# Patient Record
Sex: MALE | Race: WHITE | NOT HISPANIC OR LATINO | URBAN - METROPOLITAN AREA
[De-identification: names, ages, dates, MRNs, and addresses within clinical notes are randomized per-mention and may not be internally consistent; named-entity substitution may affect disease eponyms.]

---

## 2019-12-20 ENCOUNTER — RECORDS - HEALTHEAST (OUTPATIENT)
Dept: LAB | Facility: CLINIC | Age: 29
End: 2019-12-20

## 2019-12-22 LAB — BACTERIA SPEC CULT: NORMAL

## 2021-02-26 ENCOUNTER — COMMUNICATION - HEALTHEAST (OUTPATIENT)
Dept: TELEHEALTH | Facility: CLINIC | Age: 31
End: 2021-02-26

## 2021-02-26 ENCOUNTER — OFFICE VISIT - HEALTHEAST (OUTPATIENT)
Dept: FAMILY MEDICINE | Facility: CLINIC | Age: 31
End: 2021-02-26

## 2021-02-26 DIAGNOSIS — R41.840 CONCENTRATION DEFICIT: ICD-10-CM

## 2021-02-26 DIAGNOSIS — R45.4 DIFFICULTY CONTROLLING ANGER: ICD-10-CM

## 2021-02-26 DIAGNOSIS — F10.10 ALCOHOL CONSUMPTION BINGE DRINKING: ICD-10-CM

## 2021-02-26 DIAGNOSIS — F19.11 H/O MIXED DRUG ABUSE (H): ICD-10-CM

## 2021-02-26 DIAGNOSIS — G47.26 SHIFT WORK SLEEP DISORDER: ICD-10-CM

## 2021-02-26 DIAGNOSIS — Z13.228 SCREENING FOR METABOLIC DISORDER: ICD-10-CM

## 2021-02-26 DIAGNOSIS — F31.32 BIPOLAR AFFECTIVE DISORDER, CURRENTLY DEPRESSED, MODERATE (H): ICD-10-CM

## 2021-02-26 LAB
ALBUMIN SERPL-MCNC: 4.7 G/DL (ref 3.5–5)
ALP SERPL-CCNC: 82 U/L (ref 45–120)
ALT SERPL W P-5'-P-CCNC: 31 U/L (ref 0–45)
ANION GAP SERPL CALCULATED.3IONS-SCNC: 9 MMOL/L (ref 5–18)
AST SERPL W P-5'-P-CCNC: 25 U/L (ref 0–40)
BILIRUB SERPL-MCNC: 0.7 MG/DL (ref 0–1)
BUN SERPL-MCNC: 16 MG/DL (ref 8–22)
CALCIUM SERPL-MCNC: 9.8 MG/DL (ref 8.5–10.5)
CHLORIDE BLD-SCNC: 106 MMOL/L (ref 98–107)
CHOLEST SERPL-MCNC: 192 MG/DL
CO2 SERPL-SCNC: 28 MMOL/L (ref 22–31)
CREAT SERPL-MCNC: 1.1 MG/DL (ref 0.7–1.3)
FASTING STATUS PATIENT QL REPORTED: NO
GFR SERPL CREATININE-BSD FRML MDRD: >60 ML/MIN/1.73M2
GLUCOSE BLD-MCNC: 66 MG/DL (ref 70–125)
HBA1C MFR BLD: 5.3 %
HDLC SERPL-MCNC: 58 MG/DL
LDLC SERPL CALC-MCNC: 113 MG/DL
POTASSIUM BLD-SCNC: 4.5 MMOL/L (ref 3.5–5)
PROT SERPL-MCNC: 7.3 G/DL (ref 6–8)
SODIUM SERPL-SCNC: 143 MMOL/L (ref 136–145)
TRIGL SERPL-MCNC: 104 MG/DL
TSH SERPL DL<=0.005 MIU/L-ACNC: 0.94 UIU/ML (ref 0.3–5)

## 2021-02-26 ASSESSMENT — ANXIETY QUESTIONNAIRES
4. TROUBLE RELAXING: SEVERAL DAYS
5. BEING SO RESTLESS THAT IT IS HARD TO SIT STILL: SEVERAL DAYS
3. WORRYING TOO MUCH ABOUT DIFFERENT THINGS: SEVERAL DAYS
6. BECOMING EASILY ANNOYED OR IRRITABLE: NEARLY EVERY DAY
7. FEELING AFRAID AS IF SOMETHING AWFUL MIGHT HAPPEN: SEVERAL DAYS
2. NOT BEING ABLE TO STOP OR CONTROL WORRYING: SEVERAL DAYS
IF YOU CHECKED OFF ANY PROBLEMS ON THIS QUESTIONNAIRE, HOW DIFFICULT HAVE THESE PROBLEMS MADE IT FOR YOU TO DO YOUR WORK, TAKE CARE OF THINGS AT HOME, OR GET ALONG WITH OTHER PEOPLE: EXTREMELY DIFFICULT
1. FEELING NERVOUS, ANXIOUS, OR ON EDGE: SEVERAL DAYS
GAD7 TOTAL SCORE: 9

## 2021-02-26 ASSESSMENT — MIFFLIN-ST. JEOR: SCORE: 1981.29

## 2021-02-26 ASSESSMENT — PATIENT HEALTH QUESTIONNAIRE - PHQ9: SUM OF ALL RESPONSES TO PHQ QUESTIONS 1-9: 15

## 2021-03-01 ENCOUNTER — COMMUNICATION - HEALTHEAST (OUTPATIENT)
Dept: FAMILY MEDICINE | Facility: CLINIC | Age: 31
End: 2021-03-01

## 2021-03-02 ENCOUNTER — OFFICE VISIT - HEALTHEAST (OUTPATIENT)
Dept: BEHAVIORAL HEALTH | Facility: CLINIC | Age: 31
End: 2021-03-02

## 2021-03-02 DIAGNOSIS — F32.1 MODERATE MAJOR DEPRESSION (H): ICD-10-CM

## 2021-03-02 DIAGNOSIS — F43.23 ADJUSTMENT DISORDER WITH MIXED ANXIETY AND DEPRESSED MOOD: ICD-10-CM

## 2021-03-02 ASSESSMENT — ANXIETY QUESTIONNAIRES
2. NOT BEING ABLE TO STOP OR CONTROL WORRYING: NOT AT ALL
6. BECOMING EASILY ANNOYED OR IRRITABLE: MORE THAN HALF THE DAYS
7. FEELING AFRAID AS IF SOMETHING AWFUL MIGHT HAPPEN: NOT AT ALL
4. TROUBLE RELAXING: SEVERAL DAYS
IF YOU CHECKED OFF ANY PROBLEMS ON THIS QUESTIONNAIRE, HOW DIFFICULT HAVE THESE PROBLEMS MADE IT FOR YOU TO DO YOUR WORK, TAKE CARE OF THINGS AT HOME, OR GET ALONG WITH OTHER PEOPLE: SOMEWHAT DIFFICULT
5. BEING SO RESTLESS THAT IT IS HARD TO SIT STILL: NOT AT ALL
GAD7 TOTAL SCORE: 5
1. FEELING NERVOUS, ANXIOUS, OR ON EDGE: SEVERAL DAYS
3. WORRYING TOO MUCH ABOUT DIFFERENT THINGS: SEVERAL DAYS

## 2021-03-02 ASSESSMENT — PATIENT HEALTH QUESTIONNAIRE - PHQ9: SUM OF ALL RESPONSES TO PHQ QUESTIONS 1-9: 14

## 2021-03-18 ENCOUNTER — AMBULATORY - HEALTHEAST (OUTPATIENT)
Dept: BEHAVIORAL HEALTH | Facility: CLINIC | Age: 31
End: 2021-03-18

## 2021-05-27 ASSESSMENT — PATIENT HEALTH QUESTIONNAIRE - PHQ9
SUM OF ALL RESPONSES TO PHQ QUESTIONS 1-9: 14
SUM OF ALL RESPONSES TO PHQ QUESTIONS 1-9: 15

## 2021-05-28 ASSESSMENT — ANXIETY QUESTIONNAIRES
GAD7 TOTAL SCORE: 9
GAD7 TOTAL SCORE: 5

## 2021-06-02 ENCOUNTER — RECORDS - HEALTHEAST (OUTPATIENT)
Dept: ADMINISTRATIVE | Facility: CLINIC | Age: 31
End: 2021-06-02

## 2021-06-05 VITALS
BODY MASS INDEX: 28 KG/M2 | WEIGHT: 211.3 LBS | OXYGEN SATURATION: 96 % | HEIGHT: 73 IN | DIASTOLIC BLOOD PRESSURE: 66 MMHG | HEART RATE: 84 BPM | SYSTOLIC BLOOD PRESSURE: 112 MMHG

## 2021-06-15 NOTE — PROGRESS NOTES
MALE PREVENTATIVE EXAM    Assessment and Plan:   Patient has been advised of split billing requirements and indicates understanding: Yes    Joo was seen today for depression.    Bipolar affective disorder, currently depressed, moderate (H)  By history he does make a diagnosis of bipolar depression.  Might have manic episode when he was drinking 1 bottle of vodka last week.  Or may be just anger issues proper evaluation recommended to the psychologist     -     AMB REFERRAL TO MENTAL HEALTH AND ADDICTION  - Adult (18+); Outpatient Treatment; Individual/Couples/Family/Group Therapy/Health Psychology; [facility]; Any Clinic Location; We will contact you to schedule the appointment or plea...  -     Thyroid Stimulating Hormone (TSH)    Alcohol consumption binge drinking  Comments:  when stress and angery   Orders:  -     AMB REFERRAL TO MENTAL HEALTH AND ADDICTION  - Adult (18+); Outpatient Treatment; Individual/Couples/Family/Group Therapy/[facility]; [facility]; Any Clinic Location; We will contact you to schedule the appointment or plea...  -     Comprehensive Metabolic Panel    Difficulty controlling anger  -     AMB REFERRAL TO MENTAL HEALTH AND ADDICTION  - Adult (18+); Outpatient Treatment; Individual/Couples/Family/Group Therapy/[facility]; [facility]; Any Clinic Location; We will contact you to schedule the appointment or plea...    H/O mixed drug abuse (H)  -     AMB REFERRAL TO MENTAL HEALTH AND ADDICTION  - Adult (18+); Outpatient Treatment; Individual/Couples/Family/Group Therapy/[facility]; [facility]; Any Clinic Location; We will contact you to schedule the appointment or plea...    Shift work sleep disorder  Comments:  work overnight shift as baker at [employer]   Orders:  -     AMB REFERRAL TO MENTAL HEALTH AND ADDICTION  - Adult (18+); Outpatient Treatment; Individual/Couples/Family/Group Therapy/Health  Psychology; [facility]; Any Clinic Location; We will contact you to schedule the appointment or plea...    Concentration deficit    Screening for metabolic disorder  -     Lipid Cascade  -     Glycosylated Hemoglobin A1c      Follow up one month for mental health , after initial eval with psychologist   Next follow up:  Yearly       Immunization Review    Immunization History   Administered Date(s) Administered     Dtap 1993     Hep B, Peds or Adolescent 2004, 2004, 2005     Hepatitis A, Ped/Adol 2 Dose IM (18yr & under) 2007, 2008     IPV 1991, 1992, 1993, 1997     MMR 1992, 2004     Td, adult adsorbed, PF 2004     Tdap 2010, 2018       I have had an Advance Directives discussion with the patient.  The following are part of a depression follow up plan for the patient:  implementation of measures to provide psychological support, mental health screening assessment, mental cem screening education, mental health treatment assessment, taking history of mental health assessments and taking history of mental health management  The following high BMI interventions were performed this visit: dietary management education, guidance, and counseling     I have had an Advance Directives discussion with the patient.    Subjective:   Chief Complaint: Joo Godfrey is an 30 y.o. male here for a preventative health visit.     HPI:  Bipolar Depression/ alcohol abuse/anger management issues : Patient was new to my practice here with chief complaint of struggling with a lot of mental health issues recently had a baby girl 2 days ago currently struggling with relationship with his girlfriend they do live together with the  baby off-and-on relationship since high school, patient feel he does have a lot of concern with anger management, binge drinking, lying and cheating on previous girlfriends including current  "girlfriend since the baby he really wants to make a change in his life.  Does smoke marijuana daily basis also chew tobacco.  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  Smart of time the sleep concern could be shift disorder.  Patient also report when asked history of ADHD and he said he was diagnosed but not treated   Onset was approximately several years ago, gradually worsening since that time.  He denies current suicidal and homicidal plan or intent.   Family history significant for alcoholism, anxiety and depression.Possible organic causes contributing are: alcohol/marijuana.  Risk factors: previous episode of depression Previous treatment includes none    Healthy Habits  Are you taking a daily aspirin? No  Do you typically exercising at least 40 min, 3-4 times per week?  NO  Do you usually eat at least 4 servings of fruit and vegetables a day, include whole grains and fiber and avoid regularly eating high fat foods? NO  Have you had an eye exam in the past two years? NO  Do you see a dentist twice per year? Yes  Do you have any concerns regarding sleep? YES    Safety Screen  If you own firearms, are they secured in a locked gun cabinet or with trigger locks? The patient does not own any firearms    No data recorded    Review of Systems:  Please see above.  The rest of the review of systems are negative for all systems.     Cancer Screening     Patient has no health maintenance due at this time          Patient Care Team:  Nikki Stein MD as PCP - General (Family Medicine)        History     Reviewed By Date/Time Sections Reviewed    Deena Parnell CMA 2/26/2021 10:52 AM Tobacco    Deena Parnell CMA 2/26/2021 10:50 AM Tobacco, Alcohol, Drug Use            Objective:   Vital Signs:   Visit Vitals  /66 (Patient Site: Left Arm, Patient Position: Sitting, Cuff Size: Adult Regular)   Pulse 84   Ht 6' 1.25\" (1.861 m)   Wt 211 lb 4.8 oz (95.8 " kg)   SpO2 96%   BMI 27.69 kg/m           PHYSICAL EXAM  Physical Exam:  General Appearance:  Appears comfortable, Alert, cooperative, no distress,   Head: Normocephalic, without obvious abnormality, atraumatic  Eyes: PERRL, conjunctiva/corneas clear, EOM's intact, both eyes             Nose: Nares normal, no drainage   Throat: Lips, mucosa, and tongue normal; teeth and gums normal  Neck: Supple, symmetrical, trachea midline, no adenopathy;                      Lungs: Clear to auscultation bilaterally, respirations unlabored  Heart: Regular rate and rhythm, S1 and S2 normal, no murmur, rubs or gallop  Skin: no rashes or lesions    Nikki Stein MD 2/26/2021 12:33 PM                    Medication List      as of February 26, 2021 11:34 AM     You have not been prescribed any medications.         Additional Screenings Completed Today:

## 2021-06-15 NOTE — PROGRESS NOTES
"Mental Health Visit Note    Patient: Joo Godfrey    : 1990 MRN: [id number]    3/2/2021    Start time: 1000    Stop Time: 1052   Session # 1    Session Type: Patient is presenting for an Individual session.    Patient was identified by indicating his name and date of birth    Joo Godfrey is a 30 y.o. male is being seen today for initial evaluation per referral from   Dr. Nikki Stein due to issues related to anxiety, depression and  psychosocial stressors.    Patient stated that he had been diagnosed when he was 14 years old he believes in the [facility] with ADHD and then was referred to a Psychiatrist for medication management.  He reports he was prescribed Adderall and was on that medication for approximately 1 year although, he decided he no longer wanted to take that medication because \"I just did not feel like myself I was much quieter and I want to fit him.\"     Patient stated, that since the birth of his daughter Patrick a few days ago he is wanting to make sure that his life is on track and is making good decisions going forward.  Patient indicated that he has a history of periodic binge drinking primarily to take the edge off and to calm his worries.  Patient works full-time at [employer] shift work with varying work schedules.  He stated it can be very difficult getting consistent sleep and being consistent with his medications when his work hours vacillate.  History of 2 DWIs both entailed partaking in Rule 25 assessments as well as, at one point attending AA. Plan will be to obtain thorough intake information, completing Diagnostic Assessment, Treatment Plan and making any appropriate referrals for testing and/or med management.      Telemedicine Visit: The patient's condition can be safely assessed and treated via synchronous audio and visual telemedicine encounter.      Reason for Telemedicine Visit: Patient has requested telehealth visit    Originating Site (Patient " Location): Patient's home    Distant Site (Provider Location): Provider Remote Setting- Home Office    Consent:  The patient/guardian has verbally consented to: the potential risks and benefits of telemedicine (video visit) versus in person care; bill my insurance or make self-payment for services provided; and responsibility for payment of non-covered services.     Mode of Communication:  Video Conference via [employer] due to patient not having yet set up myVBO    As the provider I attest to compliance with applicable laws and regulations related to telemedicine.    Those present for this visit (patient and therapist)      New symptoms or complaints: Little interest in doing things feeling down, trouble falling asleep, feeling tired, overeating, feeling bad about self, trouble concentrating, procrastination, difficulty following through with things,  Plan anxious, worrying about different things, trouble relaxing, easily annoyed and irritable, intermittent binge drinking and marijuana use (per medical record)    Functional Impairment:   Personal: 4  Family: 3  Work: 3  Social:2    NORMAN-7 scoring 5 indicating mild anxiety   PHQ-9 scoring 14 indicating moderate depression   C-SSRS denies any past or current suicidal ideations or intentions    ASSESSMENT: Current Emotional / Mental Status (status of significant symptoms):              Risk status (Self / Other harm or suicidal ideation)              Patient denies any personal safety issues              Patient denies current or recent suicidal ideation or behaviors.              Patient denies current or recent homicidal ideation or behaviors.              Patient denies current or recent self injurious behavior or ideation.              Patient denies other safety concerns.              Patient denies any risk issues              Patient indicated family and girlfriend are supportive and involved              Recommended that patient call 911 or go to the  "local ED should there be a change in any of these risk factors.                Attitude:                                   Cooperative               Orientation:                             Oriented x3              Speech                                    Clear                          Rate / Production:       Normal/ Responsive Normal                           Volume:                       Normal               Mood:                                      Normal              Thought Content:                    Clear               Thought Form:                        Coherent  Logical               Insight:                                     Good     Patient's impression of their current status: Patient stated, \"I just need to get things together my daughter was born just a few days ago and I want to be a good parent.\"    Therapist impression of patients current state: The patient presented for an initial session with provider.  Patient is a 30-year-old male who was referred by Dr. Nikki Doran to engage in individual psychotherapy to address symptoms of depression, anxiety, anger and psychosocial stressors.  Patient appeared cooperative and open-minded throughout the intake and easily engaged in dialogue.  Therapist and patient verbally reviewed consent to privacy policy.  Patient reported understanding and provided verbal consent.  The patient has not engaged in outpatient psychotherapy services in the community so there is no diagnostic assessment on file available.  The patient completed the following questionnaires today PHQ-9, NORMAN-7 and C-SSRS.  No concerns about patient safety or the safety of others per assessment today.  Therapist will review patient's further history, mail out intake form and follow-up in order to complete a standard diagnostic assessment.  Goals for treatment will be established after the second or third follow-up session with this provider.    Type of psychotherapeutic technique provided: " Insight oriented and Solution-focused ADHD education    Progress toward short term goals: Not yet established    Review of long term goals: Not yet established    Diagnosis:  1.  Adjustment reaction with anxiety and depressed mood  2.  Moderate major depression (H)    Plan and Follow up:   1.  Patient to complete adult intake questionnaire as well as ADHD questionnaire  2.  Begin developing therapeutic relationship  3.  Begin establishing an exercise regime and good sleep hygiene  4.  Next appointment in 2 weeks    Discharge Criteria/Planning: Patient will continue with follow-up until therapy can be discontinued without return of signs and symptoms.     I have reviewed the note as documented above.  This accurately captures the substance of my conversation with the patient.  As the provider I attest to compliance with applicable laws and regulations related to telemedicine.  Performed and documented by   Polina Haro [facility]  3/2/21

## 2021-06-16 PROBLEM — G47.26 SHIFT WORK SLEEP DISORDER: Status: ACTIVE | Noted: 2021-02-26

## 2021-06-16 PROBLEM — R41.840 CONCENTRATION DEFICIT: Status: ACTIVE | Noted: 2021-02-26

## 2021-06-16 PROBLEM — F10.10 ALCOHOL CONSUMPTION BINGE DRINKING: Status: ACTIVE | Noted: 2021-02-26

## 2021-06-16 PROBLEM — F32.1 MODERATE MAJOR DEPRESSION (H): Status: ACTIVE | Noted: 2021-02-26

## 2021-06-16 PROBLEM — R45.4 DIFFICULTY CONTROLLING ANGER: Status: ACTIVE | Noted: 2021-02-26

## 2021-06-16 PROBLEM — F19.11: Status: ACTIVE | Noted: 2021-02-26

## 2021-06-16 NOTE — PROGRESS NOTES
Patient was scheduled for follow-up visit today although, had forgotten that he was working.  Patient rescheduled for 2 weeks.    Polina Haro [facility]  3/18/21

## 2021-06-18 NOTE — PATIENT INSTRUCTIONS - HE
Patient Instructions by Nikki Stein MD at 2/26/2021 10:40 AM     Author: Nikki Stein MD Service: -- Author Type: Physician    Filed: 2/26/2021 11:22 AM Encounter Date: 2/26/2021 Status: Signed    : Nikki Stein MD (Physician)         Patient Education     Counseling for Depression  For some people, counseling, also called talk therapy, has been found to be as effective as medicine for mild to moderate depression. When done by a trained professional, this treatment is a powerful way to better understand your thoughts and feelings. Like medicine, it may take time before you notice how much counseling is helping.    Kinds of talk therapy  Different counselors use different methods for talk therapy. But all therapy aims to help change how you think about your problem. Most therapy for depression is often done one-on-one. But it may also be done in a group setting. You and your healthcare provider can discuss the type of therapy you think would work best for you. You can also discuss who the best person is to provide the therapy.  How therapy helps  Talking about your problems can help them seem less overwhelming. It can help work through problems you have with your life and your relationships. It can also help you understand how depression is clouding your thinking, not letting you see the world the way it really is. Therapy can give you:    Insight about your emotions    New tools for dealing with your problems    Emotional support for making progress  Getting better takes time  Talk therapy can help you feel better. But change doesnt happen right away. Depression takes away your energy and motivation. So it can be hard to feel like going to therapy and sticking with it. But therapy has been proven to be very valuable in the treatment of depression. Therapy for depression is often done for a set number of sessions. In other cases, you and your therapist decide together at what point you no longer need  therapy.  Additional sources of help  In addition to a professional counselor, it may help to talk to other people in your life. You may find support and insight from:    A close friend or family member    A  trained in counseling    A local support group or community group    A 12-step program (such as Alcoholics Anonymous) for dealing with problems that can contribute to depression, such as alcohol or drug addiction  Date Last Reviewed: 1/1/2017 2000-2019 [employer]. [address] [place] [address]. All rights reserved. This information is not intended as a substitute for professional medical care. Always follow your healthcare professional's instructions.           Patient Education     Depression: Tips to Help Yourself    As your healthcare providers help treat your depression, you can also help yourself. Keep in mind that your illness affects you emotionally, physically, mentally, and socially. So full recovery will take time. Take care of your body and your soul, and be patient with yourself as you get better.  Self-care    Educate yourself. Read about treatment and medicine options. If you have the energy, attend local conferences or support groups. Keep a list of useful websites and helpful books and use them as needed. This illness is not your fault. Dont blame yourself for your depression.    Manage early symptoms. If you notice symptoms returning, experience triggers, or identify other factors that may lead to a depressive episode, get help as soon as possible. Ask trusted friends and family to monitor your behavior and let you know if they see anything of concern.    Work with your provider. Find a provider you can trust. Communicate honestly with that person and share information on your treatment for depression and your reaction to medicines.    Be prepared for a crisis. Know what to do if you experience a crisis. Keep the phone number of a crisis hotline and  "know the location of your community's urgent care centers and the closest emergency department.    Hold off on big decisions. Depression can cloud your judgment. So wait until you feel better before making major life decisions, such as changing jobs, moving, or getting  or .    Be patient. Recovering from depression is a process. Dont be discouraged if it takes some time to feel better.    Keep it simple. Depression saps your energy and concentration. So you wont be able to do all the things you used to do. Set small goals and do what you can.    Be with others. Dont isolate yourself--youll only feel worse. Try to be with other people. And take part in fun activities when you can. Go to a movie, ballgame, Mosque service, or social event. Talk openly with people you can trust. And accept help when its offered.  Take care of your body  People with depression often lose the desire to take care of themselves. That only makes their problems worse. During treatment and afterward, make a point to:    Exercise. Its a great way to take care of your body. And studies have shown that exercise helps fight depression.    Avoid drugs and alcohol. These may ease the pain in the short term. But theyll only make your problems worse in the long run.    Get relief from stress. Ask your healthcare provider for relaxation exercises and techniques to help relieve stress.    Eat right. A balanced and healthy diet helps keep your body healthy.  Date Last Reviewed: 1/1/2017 2000-2019 [employer]. [address] [place] [address], [place] [address]. All rights reserved. This information is not intended as a substitute for professional medical care. Always follow your healthcare professional's instructions.           Patient Education     Bipolar Disorder  Bipolar disorder is an illness that causes strong mood swings between depression and kika. It used to be called \"manic depression.\" The mood swings are different " from the normal ups and downs we all experience in our lives. They are more severe, last longer, and can interfere with work and relationships. These episodes are changes from our usual moods and behavior. Their severity can be mild, or drastic and explosive.    In a manic episode, you may think fast and do things quickly. It may seem like you are getting a lot done. At first, this may feel very good. But in the extreme this can lead to a lifestyle that is disorganized, chaotic, and includes risky behavior (spending sprees, sexual acting-out, or drug use). In later stages, it may affect eating (no interest in food) and sleeping (unable to sleep for days at a time). Speech may speed up and become difficult for others to understand. You may appear to others as if you are in your own world.    In a depressive episode, you may feel a lack of interest in normal activities. Sometimes there is sadness or guilt without any clear reason. Thinking may become slow and there can be a lack energy or feeling of hopelessness. Some people have thoughts of harming themselves at this stage. Thoughts can even turn to suicide.  Between these phases you may actually feel OK. This does not mean that the illness is gone. People with this disorder will usually have to treat it all of their life. Medicine and good care can greatly reduce the symptoms.  The exact cause of this illness is unknown. However, there is a genetic link that makes a person more likely to get this problem. Also, the use of drugs such as speed (amphetamine) and cocaine increase the chances of this illness appearing.  Home care  Here is what you can do at home:    Ongoing care and support help people manage this disease. Find a healthcare provider and therapist who meet your needs. Seek help when you feel like you may be heading into either a manic episode or a depressive state.    Be sure to take your medicine and get regular blood work to check the levels of medicine  in your body. Take the medicine and get the follow-up lab work as prescribed, even if you think you dont need to do it.    Be certain to tell each of your healthcare providers about all of the prescription and over-the-counter medicines, and supplements you take. Certain supplements interact with medicines and result in dangerous side effects. You can also use your pharmacist as a resource person when you have questions about medicine interactions.    Talk with your family and trusted friends about your thoughts and feelings. Ask them to help you recognize behavior changes early so you can get help and medicines can be adjusted.    Alcohol and drugs can bring on an episode, and make them worse    If your life is severely impacted by this illness, the Americans with Disabilities Act (ADA) may provide help. The ADA protects people with chronic physical and mental health problems. If you are having trouble keeping jobs, managing workplace issues, or caring for yourself because of your bipolar disorder, contact your local ADA office to see if it can help. The [place] [employer] operates a toll-free ADA information line at: 891.527.4818 (Voice); or 999-542-4089 (TTY). It can help you locate a local office.    Follow-up care  Follow up with your healthcare provider or therapist as advised. They can help you to find ways to improve your life.  Call 911  Call 911 if any of these happen:    You have suicidal thoughts, a plan, and the means to  harm yourself, or serious thoughts of hurting someone else    Trouble breathing    Confusion    Drowsiness or trouble wakening    Fainting or loss of consciousness    Rapid heart rate, very low heart rate, or a new irregular heart rate    Seizure    New chest pain that becomes more severe, lasts longer, or spreads into your shoulder, arm, neck, jaw, or back  When to seek medical advice  Call your healthcare provider right away if any of these happen:    Feeling like your symptoms  are getting worse (depression, agitation, and excess energy)    Unable to eat or sleep for more than 48 hours    Feeling out of control (racing thoughts, or poor concentration)    Feeling like you want to harm yourself or another    Being unable to care for yourself  Date Last Reviewed: 10/1/2017    5824-1073 [employer]. [address] [place] [address], [place] [address]. All rights reserved. This information is not intended as a substitute for professional medical care. Always follow your healthcare professional's instructions.           Patient Education     Treating Bipolar Disorder    Bipolar disorder results in extreme mood swings that can greatly disrupt your life. These symptoms may cause you distress. But with treatment, you can lead a more normal life.  Medicines  Bipolar disorder is often treated with medicines that stabilize moods. They help you feel better by keeping your moods more even, and help prevent future mood swings. Sometimes you may also be prescribed medicines that treat depression. Take your medicine as prescribed. All medicines can have side effects. If youre troubled by side effects, tell your healthcare provider. But dont stop taking your medicine until your healthcare provider tells you. If you do, your symptoms will likely come back.  Talk therapy (psychotherapy)  Talking to a therapist or counselor may be part of your treatment. Having bipolar disorder can make it hard to hold a job or go to school. It can create stress for both you and your loved ones. A therapist can teach you how to cope with bipolar disorder. This can help you lessen manic or depressive episodes, or even prevent them. Your therapist can help you work out problems and heal relationships. He or she can also provide support when you need it most.  Friends and family  Those closest to you may also need support. There are many groups for families of people with bipolar disorder. Learning more about this disorder  can help your loved ones cope. It can also help them take an active role in your care.  Looking ahead  People with bipolar disorder have periods with no symptoms. But it is a chronic illness that requires lifetime care. Just as with heart conditions or diabetes, bipolar symptoms can return or treatments many need to be changed. Ongoing professional support is key to effective long-term management. Much research is being done on bipolar disorder. This research may lead to improved treatments and hope for a better future.  Resources    National [place] of Mental Health  830.800.3412  www.[facility].nih.gov    National [place] on Mental Illness  145.959.8754  www.nisha.org    [facility]  728.627.1959  www.[facility].org    National Suicide Prevention Lifeline 663-273-WNUR (238-519-0745) www.suicidepreventionlifeline.org   Date Last Reviewed: 5/1/2017 2000-2019 [employer]. [address] [place] [address]. All rights reserved. This information is not intended as a substitute for professional medical care. Always follow your healthcare professional's instructions.           Patient Education     Understanding Bipolar Disorder  Bipolar disorder is a serious disorder of the brain. It may severely disrupt your life. At times, it may cause you and your loved ones great pain. But there is hope. Although there is no cure, treatment can help control your symptoms. Talk with your healthcare provider or a mental health professional. He or she can offer guidance and support.    What causes bipolar disorder?  The exact causes of bipolar disorder arent known. It is known that the disease runs in families. Genes that affect nerve cells in the brain may be inherited, but as yet these genes have not been found.  Who does it affect?  Over 5 million adults in this country have bipolar disorder. Most often, it strikes young adults. It can affect children and older adults as well. Bipolar disorder affects both  men and women. It can strike people of all races, cultures, and incomes.  Ups and downs  Bipolar disorder used to be called manic-depressive illness. That is because it causes extreme mood swings. At times the person may feel almost too happy. These times are often followed by great despair. In some cases, both extremes may occur at once. More often, moods shift back and forth. These mood swings may occur just once in a while. Or they may happen 4 or more times a year. Without treatment, they will likely recur throughout life.  Manic episodes  During manic episodes of bipolar disorder, you feel like youre on top of the world. Even the worst news cant bring you down. Youll likely feel as if you can do anything. And sometimes you may try. You may take great risks, thinking you cant be hurt. You may also talk too fast, and your thoughts may race. You may go for days without sleeping. And you might be very active and do a lot of things in a short time. Manic episodes often end in a depression.  Depressive episodes  In depressive episodes, you feel intense sadness and depression. You may also feel worthless, tired, and helpless. Even the things you value most dont give you pleasure. At times you may want to die. You may even think about taking your own life.  Date Last Reviewed: 2/1/2017 2000-2019 [employer]. [address] [place] [address], [place] [address]. All rights reserved. This information is not intended as a substitute for professional medical care. Always follow your healthcare professional's instructions.

## 2021-10-19 PROBLEM — F32.9 MAJOR DEPRESSION: Status: ACTIVE | Noted: 2021-02-26
